# Patient Record
(demographics unavailable — no encounter records)

---

## 2024-10-16 NOTE — HISTORY OF PRESENT ILLNESS
[de-identified] : 43-year-old female with a history of GERD, globus sensation, presents today for new patient exam  Patient has felt since January  - sweating  - 1-2 times starts  - starts with pinching pain in the shoulder and seems to occur  - notes numbness tingling in the left arm   Endocrinology  - going ot see endo for sweating  - everything is normal on blood  - CAT scan was done  - all organs are normal   Anxiety - patient notes she is not taking anxiety medicaiton and her symptoms seems only periodic   Feels urgency in stool  - feels sob  Left shoulder pain - feels stabbing pains  - taking therapy which helps   thoracic back pain

## 2024-10-16 NOTE — HISTORY OF PRESENT ILLNESS
[de-identified] : 43-year-old female with a history of GERD, globus sensation, presents today for new patient exam  Patient has felt since January  - sweating  - 1-2 times starts  - starts with pinching pain in the shoulder and seems to occur  - notes numbness tingling in the left arm   Endocrinology  - going ot see endo for sweating  - everything is normal on blood  - CAT scan was done  - all organs are normal   Anxiety - patient notes she is not taking anxiety medicaiton and her symptoms seems only periodic   Feels urgency in stool  - feels sob  Left shoulder pain - feels stabbing pains  - taking therapy which helps   thoracic back pain

## 2025-01-07 NOTE — HISTORY OF PRESENT ILLNESS
[de-identified] : 43-year-old female presenting for follow-up of condition where she is having episodes of sweating, abdominal discomfort continues to have palpitations  At our last visit we had discussed a thorough evaluation of endocrine with ruling out of hyperthyroidism or other hormone based condition Also discussed the possibility of panic disorder given the presentation  Since her last visit she had a full workup from endocrinology and we reviewed her labs including an assessment of serotonin, and assessment of thyroid, estrogen and testosterone all of which were within normal limits She has seen neurology since then and had an EEG and is awaiting the results of Brain MRI cervical spine MRI as well  After discussion with the patient she notes the neurology is primary concern is migraines which may be a cause of her presentation  Since her last visit the patient has noted less frequent occurrences of these episodes They occur once weekly or every 2 weeks at this point She denies any new symptoms or worsening symptoms

## 2025-01-07 NOTE — ASSESSMENT
[FreeTextEntry1] : After discussion with the patient regarding her presentation And review of the labs from endocrinology including TSH, metabolic panel, hormone levels Have discussed the 2 possibilities I recommended that she explore with neurology the findings Consider whether these are migraines If they are not then she should consider that these may be panic disorder After discussion of the symptoms the patient is willing to trial a benzodiazepine for panic disorder specifically Recommended that she consider taking a half dose only in the event of an emergency or feeling significant symptoms She is aware of the risks of the medication including habit-forming nature, drowsiness She is aware she should not drive or work under the influence of this medication I stop checked [id number]

## 2025-03-11 NOTE — PHYSICAL EXAM
[Normal] : no joint swelling and grossly normal strength and tone [de-identified] : Mild right upper and right lower quadrant discomfort without any rebound or guarding [de-identified] : 2 cm diameter subcutaneous mass which is somewhat amorphous and mobile Soft and nontender

## 2025-03-11 NOTE — ASSESSMENT
[FreeTextEntry1] : Right upper and lower quadrant pain Recommend CT scan This has been ongoing pain whose source has not been identified on ultrasound No real source on endoscopy Recommending scan to assess further  GERD Highly suspect GERD is the main motivator of the nausea and globus sensation component of the patient's symptoms Recommending omeprazole and discussed appropriate use of omeprazole  Patient has a right subcutaneous mass in the antecubital fossa suspected to be a lipoma Recommending ultrasound to assess further

## 2025-03-11 NOTE — PHYSICAL EXAM
[Normal] : no joint swelling and grossly normal strength and tone [de-identified] : Mild right upper and right lower quadrant discomfort without any rebound or guarding [de-identified] : 2 cm diameter subcutaneous mass which is somewhat amorphous and mobile Soft and nontender

## 2025-03-11 NOTE — HISTORY OF PRESENT ILLNESS
[FreeTextEntry8] : 43-year-old female with a history of multiple concerns presents today for acute exam due to missed period  Patient presents today with concern for right upper to lower quadrant discomfort which has been ongoing for over a year She has had an evaluation with endoscopy, also ultrasound which have been largely unremarkable The patient denies any abdominal bleeding or diarrhea She does endorse nausea and globus sensation which have been persistent Of note she cannot remember if she had an improvement with omeprazole after her EGD in 2023 She would like further investigation of this pain  Also the patient has noted a right antecubital fossa subcutaneous mass that is increased over time that is painless